# Patient Record
Sex: FEMALE | Race: OTHER | NOT HISPANIC OR LATINO | URBAN - METROPOLITAN AREA
[De-identification: names, ages, dates, MRNs, and addresses within clinical notes are randomized per-mention and may not be internally consistent; named-entity substitution may affect disease eponyms.]

---

## 2024-01-31 ENCOUNTER — INPATIENT (INPATIENT)
Facility: HOSPITAL | Age: 24
LOS: 2 days | Discharge: HOME | End: 2024-02-03
Attending: OBSTETRICS & GYNECOLOGY | Admitting: OBSTETRICS & GYNECOLOGY
Payer: MEDICAID

## 2024-01-31 VITALS
HEART RATE: 89 BPM | SYSTOLIC BLOOD PRESSURE: 128 MMHG | DIASTOLIC BLOOD PRESSURE: 79 MMHG | OXYGEN SATURATION: 98 % | RESPIRATION RATE: 18 BRPM | TEMPERATURE: 98 F

## 2024-01-31 DIAGNOSIS — Z34.80 ENCOUNTER FOR SUPERVISION OF OTHER NORMAL PREGNANCY, UNSPECIFIED TRIMESTER: ICD-10-CM

## 2024-01-31 DIAGNOSIS — O26.899 OTHER SPECIFIED PREGNANCY RELATED CONDITIONS, UNSPECIFIED TRIMESTER: ICD-10-CM

## 2024-01-31 DIAGNOSIS — Z3A.39 39 WEEKS GESTATION OF PREGNANCY: ICD-10-CM

## 2024-01-31 LAB
APTT BLD: 29.4 SEC — SIGNIFICANT CHANGE UP (ref 24.5–35.6)
BASOPHILS # BLD AUTO: 0.04 K/UL — SIGNIFICANT CHANGE UP (ref 0–0.2)
BASOPHILS NFR BLD AUTO: 0.3 % — SIGNIFICANT CHANGE UP (ref 0–2)
BLD GP AB SCN SERPL QL: SIGNIFICANT CHANGE UP
EOSINOPHIL # BLD AUTO: 0 K/UL — SIGNIFICANT CHANGE UP (ref 0–0.5)
EOSINOPHIL NFR BLD AUTO: 0 % — SIGNIFICANT CHANGE UP (ref 0–6)
FIBRINOGEN PPP-MCNC: 478 MG/DL — HIGH (ref 200–475)
HCT VFR BLD CALC: 34.9 % — SIGNIFICANT CHANGE UP (ref 34.5–45)
HGB BLD-MCNC: 11.5 G/DL — SIGNIFICANT CHANGE UP (ref 11.5–15.5)
IMM GRANULOCYTES NFR BLD AUTO: 1 % — HIGH (ref 0–0.9)
INR BLD: 0.9 RATIO — SIGNIFICANT CHANGE UP (ref 0.85–1.18)
LYMPHOCYTES # BLD AUTO: 18.1 % — SIGNIFICANT CHANGE UP (ref 13–44)
LYMPHOCYTES # BLD AUTO: 2.84 K/UL — SIGNIFICANT CHANGE UP (ref 1–3.3)
MCHC RBC-ENTMCNC: 27.5 PG — SIGNIFICANT CHANGE UP (ref 27–34)
MCHC RBC-ENTMCNC: 33 GM/DL — SIGNIFICANT CHANGE UP (ref 32–36)
MCV RBC AUTO: 83.5 FL — SIGNIFICANT CHANGE UP (ref 80–100)
MONOCYTES # BLD AUTO: 0.72 K/UL — SIGNIFICANT CHANGE UP (ref 0–0.9)
MONOCYTES NFR BLD AUTO: 4.6 % — SIGNIFICANT CHANGE UP (ref 2–14)
NEUTROPHILS # BLD AUTO: 11.92 K/UL — HIGH (ref 1.8–7.4)
NEUTROPHILS NFR BLD AUTO: 76 % — SIGNIFICANT CHANGE UP (ref 43–77)
NRBC # BLD: 0 /100 WBCS — SIGNIFICANT CHANGE UP (ref 0–0)
PLATELET # BLD AUTO: 196 K/UL — SIGNIFICANT CHANGE UP (ref 150–400)
PROTHROM AB SERPL-ACNC: 10.3 SEC — SIGNIFICANT CHANGE UP (ref 9.5–13)
RBC # BLD: 4.18 M/UL — SIGNIFICANT CHANGE UP (ref 3.8–5.2)
RBC # FLD: 13.2 % — SIGNIFICANT CHANGE UP (ref 10.3–14.5)
WBC # BLD: 15.68 K/UL — HIGH (ref 3.8–10.5)
WBC # FLD AUTO: 15.68 K/UL — HIGH (ref 3.8–10.5)

## 2024-01-31 RX ORDER — OXYTOCIN 10 UNIT/ML
333.33 VIAL (ML) INJECTION
Qty: 20 | Refills: 0 | Status: COMPLETED | OUTPATIENT
Start: 2024-01-31

## 2024-01-31 RX ORDER — OXYTOCIN 10 UNIT/ML
VIAL (ML) INJECTION
Qty: 30 | Refills: 0 | Status: DISCONTINUED | OUTPATIENT
Start: 2024-01-31 | End: 2024-02-01

## 2024-01-31 RX ORDER — CITRIC ACID/SODIUM CITRATE 300-500 MG
15 SOLUTION, ORAL ORAL EVERY 6 HOURS
Refills: 0 | Status: DISCONTINUED | OUTPATIENT
Start: 2024-01-31 | End: 2024-02-01

## 2024-01-31 RX ORDER — SODIUM CHLORIDE 9 MG/ML
1000 INJECTION, SOLUTION INTRAVENOUS
Refills: 0 | Status: DISCONTINUED | OUTPATIENT
Start: 2024-01-31 | End: 2024-02-03

## 2024-01-31 RX ORDER — CHLORHEXIDINE GLUCONATE 213 G/1000ML
1 SOLUTION TOPICAL DAILY
Refills: 0 | Status: DISCONTINUED | OUTPATIENT
Start: 2024-01-31 | End: 2024-02-01

## 2024-01-31 RX ORDER — OXYTOCIN 10 UNIT/ML
333.33 VIAL (ML) INJECTION
Qty: 20 | Refills: 0 | Status: DISCONTINUED | OUTPATIENT
Start: 2024-01-31 | End: 2024-02-01

## 2024-01-31 RX ORDER — SODIUM CHLORIDE 9 MG/ML
1000 INJECTION, SOLUTION INTRAVENOUS
Refills: 0 | Status: DISCONTINUED | OUTPATIENT
Start: 2024-01-31 | End: 2024-02-01

## 2024-01-31 RX ADMIN — SODIUM CHLORIDE 125 MILLILITER(S): 9 INJECTION, SOLUTION INTRAVENOUS at 16:18

## 2024-01-31 RX ADMIN — SODIUM CHLORIDE 125 MILLILITER(S): 9 INJECTION, SOLUTION INTRAVENOUS at 20:40

## 2024-01-31 RX ADMIN — Medication 2 MILLIUNIT(S)/MIN: at 16:42

## 2024-01-31 NOTE — OB PROVIDER TRIAGE NOTE - HISTORY OF PRESENT ILLNESS
22 yo  at 39.6 wks (LMP 23, EDIS 24) presenting from the office for early labor. Pt seen by Dr. Da Silva and was dilated to 3 cm and brought to the hospital by ambulance. Pt experiencing contraction pain every 6 minutes. Reports + fetal movement. Denies vaginal bleeding, loss of fluid.    PNC: Followed by Dr. Da Silva. GBS negative, RPR immune, O pos. Denies GDM, GHTN.   GynHx: Denies fibroids, cysts, STD, abnormal PAP  PMHx: denies   Meds: previously taking PO iron but d/c due to vomiting   PSHx: denies   Allergies: no known allergies   Social: denies tobacco/etoh/drug use   Psych: denies anxiety, depression, PTSD

## 2024-01-31 NOTE — OB PROVIDER IHI INDUCTION/AUGMENTATION NOTE - NS_OBIHIFHRDETAILS_OBGYN_ALL_OB_FT
Baseline: 150 bpm  Variability: Moderate variability  Accelerations: Absent  Decelerations: Absent    Cat I tracing, nonreactive

## 2024-01-31 NOTE — OB RN TRIAGE NOTE - FALL HARM RISK - UNIVERSAL INTERVENTIONS
Bed in lowest position, wheels locked, appropriate side rails in place/Call bell, personal items and telephone in reach/Instruct patient to call for assistance before getting out of bed or chair/Non-slip footwear when patient is out of bed/[place] to call system/Physically safe environment - no spills, clutter or unnecessary equipment/Purposeful Proactive Rounding/Room/bathroom lighting operational, light cord in reach

## 2024-01-31 NOTE — OB PROVIDER TRIAGE NOTE - NSHPPHYSICALEXAM_GEN_ALL_CORE
General: pt is resting comfortably in bed in between contractions, breathing deep through each contraction, NAD, A&Ox3  Abdominal: gravid uterus, soft nontender, no guarding or rebound tenderness   Extremities: no edema   Vaginal: no external or internal lesions, no gross vaginal bleeding    4/70/-3    FHT: baseline 150, moderate variability, + accels   toco q 6-7 minutes

## 2024-01-31 NOTE — OB PROVIDER LABOR PROGRESS NOTE - ASSESSMENT
dereck A/P 24yo GP presenting . Patient is stable.  - cont close maternal and fetal monitoring  - for epidural  - cont pitocin  - d/w Dr. Mir kaur attending

## 2024-01-31 NOTE — OB PROVIDER H&P - HISTORY OF PRESENT ILLNESS
24 yo  at 39.6 wks (LMP 23, EDIS 24) presenting from the office for early labor. Pt seen by Dr. Da Silva and was dilated to 3 cm and brought to the hospital by ambulance. Pt experiencing contraction pain every 6 minutes. Reports + fetal movement. Denies vaginal bleeding, loss of fluid.  Denies headache, visual disturbances, epigastric pain, pelvic/abdominal pain.     PNC: Followed by Dr. Da Silva. GBS negative, RPR immune, O pos. Denies GDM, GHTN.   GynHx: Denies fibroids, cysts, STD, abnormal PAP  PMHx: denies   Meds: previously taking PO iron but d/c due to vomiting   PSHx: denies   Allergies: no known allergies   Social: denies tobacco/etoh/drug use   Psych: denies anxiety, depression, PTSD

## 2024-01-31 NOTE — OB PROVIDER H&P - ASSESSMENT
22 yo  at 39.6 wks presenting for early labor     GBS negative, RPR immune     expect NVSD  discussed with Dr. Hester  22 yo  at 39.6 wks presenting in early labor. Patient is stable.  -admit to labor and delivery  - f/up rpr, coags, t&s  - consents signed  - patient does not desire epidural at this time  -discussed with Dr. Hester

## 2024-01-31 NOTE — OB PROVIDER H&P - NSHPPHYSICALEXAM_GEN_ALL_CORE
General: patient resting comfortably in bed, NAD, A&Ox3  Abdominal: gravid uterus, soft nontender, no guarding or rebound tenderness  Extremities: no edema   Vaginal: 4/70/-3

## 2024-01-31 NOTE — OB PROVIDER TRIAGE NOTE - NSOBPROVIDERNOTE_OBGYN_ALL_OB_FT
22 yo  at 39.6 wks presenting from the office for early labor, 3 cm dilation.     Expectant management   Start Pitocin to facilitate contractions   NST reviewed with Dr. Hester

## 2024-01-31 NOTE — OB RN DELIVERY SUMMARY - NS_SEPSISRSKCALC_OBGYN_ALL_OB_FT
GBS status in the 'Prenatal Lab tests/results section' on the OB RN Patient Profile must be documented.   EOS calculated successfully. EOS Risk Factor: 0.5/1000 live births ([facility] national incidence); GA=40w;Temp=98.6; ROM=7.417; GBS='Negative'; Antibiotics='No antibiotics or any antibiotics < 2 hrs prior to birth'

## 2024-01-31 NOTE — OB RN DELIVERY SUMMARY - NSSELHIDDEN_OBGYN_ALL_OB_FT
[NS_DeliveryAttending1_OBGYN_ALL_OB_FT:[email or web address]=] [NS_DeliveryAttending1_OBGYN_ALL_OB_FT:[email or web address]=],[NS_DeliveryAssist1_OBGYN_ALL_OB_FT:[id number]==]

## 2024-01-31 NOTE — OB PROVIDER LABOR PROGRESS NOTE - NS_OBIHIFHRDETAILS_OBGYN_ALL_OB_FT
minimal variability with [id number], no accels, no decels   reviewed with Dr. Rose
Baseline: 150 bpm  Variability: Moderate variability  Accelerations: Present  Decelerations: prolonged    Cat II tracing, reactive

## 2024-01-31 NOTE — OB PROVIDER H&P - NSLOWPPHRISK_OBGYN_A_OB
No previous uterine incision/Fang Pregnancy/Less than or equal to 4 previous vaginal births/No known bleeding disorder/No history of postpartum hemorrhage

## 2024-01-31 NOTE — OB PROVIDER LABOR PROGRESS NOTE - ASSESSMENT
22yo  siup @ 39.6wks, in early labor  NST reviewed with Dr. Hester, continue close fetal monitoring and resuscitation  Continue pitocin augmentation  ISE/IUPC in place with amnioinfusion

## 2024-02-01 LAB
BASOPHILS # BLD AUTO: 0.05 K/UL — SIGNIFICANT CHANGE UP (ref 0–0.2)
BASOPHILS NFR BLD AUTO: 0.3 % — SIGNIFICANT CHANGE UP (ref 0–2)
EOSINOPHIL # BLD AUTO: 0.01 K/UL — SIGNIFICANT CHANGE UP (ref 0–0.5)
EOSINOPHIL NFR BLD AUTO: 0.1 % — SIGNIFICANT CHANGE UP (ref 0–6)
HCT VFR BLD CALC: 30.7 % — LOW (ref 34.5–45)
HGB BLD-MCNC: 9.9 G/DL — LOW (ref 11.5–15.5)
IMM GRANULOCYTES NFR BLD AUTO: 1.2 % — HIGH (ref 0–0.9)
LYMPHOCYTES # BLD AUTO: 23.3 % — SIGNIFICANT CHANGE UP (ref 13–44)
LYMPHOCYTES # BLD AUTO: 4.09 K/UL — HIGH (ref 1–3.3)
MCHC RBC-ENTMCNC: 27.4 PG — SIGNIFICANT CHANGE UP (ref 27–34)
MCHC RBC-ENTMCNC: 32.2 GM/DL — SIGNIFICANT CHANGE UP (ref 32–36)
MCV RBC AUTO: 85 FL — SIGNIFICANT CHANGE UP (ref 80–100)
MONOCYTES # BLD AUTO: 1.31 K/UL — HIGH (ref 0–0.9)
MONOCYTES NFR BLD AUTO: 7.5 % — SIGNIFICANT CHANGE UP (ref 2–14)
NEUTROPHILS # BLD AUTO: 11.86 K/UL — HIGH (ref 1.8–7.4)
NEUTROPHILS NFR BLD AUTO: 67.6 % — SIGNIFICANT CHANGE UP (ref 43–77)
NRBC # BLD: 0 /100 WBCS — SIGNIFICANT CHANGE UP (ref 0–0)
PLATELET # BLD AUTO: 193 K/UL — SIGNIFICANT CHANGE UP (ref 150–400)
RBC # BLD: 3.61 M/UL — LOW (ref 3.8–5.2)
RBC # FLD: 13.6 % — SIGNIFICANT CHANGE UP (ref 10.3–14.5)
T PALLIDUM AB TITR SER: NEGATIVE — SIGNIFICANT CHANGE UP
WBC # BLD: 17.53 K/UL — HIGH (ref 3.8–10.5)
WBC # FLD AUTO: 17.53 K/UL — HIGH (ref 3.8–10.5)

## 2024-02-01 PROCEDURE — 88307 TISSUE EXAM BY PATHOLOGIST: CPT | Mod: 26

## 2024-02-01 RX ORDER — AER TRAVELER 0.5 G/1
1 SOLUTION RECTAL; TOPICAL EVERY 4 HOURS
Refills: 0 | Status: DISCONTINUED | OUTPATIENT
Start: 2024-02-01 | End: 2024-02-03

## 2024-02-01 RX ORDER — LANOLIN
1 OINTMENT (GRAM) TOPICAL EVERY 6 HOURS
Refills: 0 | Status: DISCONTINUED | OUTPATIENT
Start: 2024-02-01 | End: 2024-02-03

## 2024-02-01 RX ORDER — MAGNESIUM HYDROXIDE 400 MG/1
30 TABLET, CHEWABLE ORAL
Refills: 0 | Status: DISCONTINUED | OUTPATIENT
Start: 2024-02-01 | End: 2024-02-03

## 2024-02-01 RX ORDER — PRAMOXINE HYDROCHLORIDE 150 MG/15G
1 AEROSOL, FOAM RECTAL EVERY 4 HOURS
Refills: 0 | Status: DISCONTINUED | OUTPATIENT
Start: 2024-02-01 | End: 2024-02-03

## 2024-02-01 RX ORDER — DIBUCAINE 1 %
1 OINTMENT (GRAM) RECTAL EVERY 6 HOURS
Refills: 0 | Status: DISCONTINUED | OUTPATIENT
Start: 2024-02-01 | End: 2024-02-03

## 2024-02-01 RX ORDER — SODIUM CHLORIDE 9 MG/ML
3 INJECTION INTRAMUSCULAR; INTRAVENOUS; SUBCUTANEOUS EVERY 8 HOURS
Refills: 0 | Status: DISCONTINUED | OUTPATIENT
Start: 2024-02-01 | End: 2024-02-03

## 2024-02-01 RX ORDER — INFLUENZA VIRUS VACCINE 15; 15; 15; 15 UG/.5ML; UG/.5ML; UG/.5ML; UG/.5ML
0.5 SUSPENSION INTRAMUSCULAR ONCE
Refills: 0 | Status: DISCONTINUED | OUTPATIENT
Start: 2024-02-01 | End: 2024-02-03

## 2024-02-01 RX ORDER — BENZOCAINE 10 %
1 GEL (GRAM) MUCOUS MEMBRANE EVERY 6 HOURS
Refills: 0 | Status: DISCONTINUED | OUTPATIENT
Start: 2024-02-01 | End: 2024-02-03

## 2024-02-01 RX ORDER — IBUPROFEN 200 MG
600 TABLET ORAL EVERY 6 HOURS
Refills: 0 | Status: DISCONTINUED | OUTPATIENT
Start: 2024-02-01 | End: 2024-02-03

## 2024-02-01 RX ORDER — ACETAMINOPHEN 500 MG
975 TABLET ORAL EVERY 6 HOURS
Refills: 0 | Status: DISCONTINUED | OUTPATIENT
Start: 2024-02-01 | End: 2024-02-03

## 2024-02-01 RX ORDER — KETOROLAC TROMETHAMINE 30 MG/ML
30 SYRINGE (ML) INJECTION ONCE
Refills: 0 | Status: DISCONTINUED | OUTPATIENT
Start: 2024-02-01 | End: 2024-02-03

## 2024-02-01 RX ORDER — SIMETHICONE 80 MG/1
80 TABLET, CHEWABLE ORAL EVERY 4 HOURS
Refills: 0 | Status: DISCONTINUED | OUTPATIENT
Start: 2024-02-01 | End: 2024-02-03

## 2024-02-01 RX ORDER — TETANUS TOXOID, REDUCED DIPHTHERIA TOXOID AND ACELLULAR PERTUSSIS VACCINE, ADSORBED 5; 2.5; 8; 8; 2.5 [IU]/.5ML; [IU]/.5ML; UG/.5ML; UG/.5ML; UG/.5ML
0.5 SUSPENSION INTRAMUSCULAR ONCE
Refills: 0 | Status: DISCONTINUED | OUTPATIENT
Start: 2024-02-01 | End: 2024-02-03

## 2024-02-01 RX ORDER — OXYCODONE HYDROCHLORIDE 5 MG/1
5 TABLET ORAL
Refills: 0 | Status: DISCONTINUED | OUTPATIENT
Start: 2024-02-01 | End: 2024-02-03

## 2024-02-01 RX ORDER — IBUPROFEN 200 MG
600 TABLET ORAL EVERY 6 HOURS
Refills: 0 | Status: COMPLETED | OUTPATIENT
Start: 2024-02-01 | End: 2024-12-30

## 2024-02-01 RX ORDER — DIPHENHYDRAMINE HCL 50 MG
25 CAPSULE ORAL EVERY 6 HOURS
Refills: 0 | Status: DISCONTINUED | OUTPATIENT
Start: 2024-02-01 | End: 2024-02-03

## 2024-02-01 RX ORDER — HYDROCORTISONE 1 %
1 OINTMENT (GRAM) TOPICAL EVERY 6 HOURS
Refills: 0 | Status: DISCONTINUED | OUTPATIENT
Start: 2024-02-01 | End: 2024-02-03

## 2024-02-01 RX ADMIN — Medication 975 MILLIGRAM(S): at 12:39

## 2024-02-01 RX ADMIN — SODIUM CHLORIDE 3 MILLILITER(S): 9 INJECTION INTRAMUSCULAR; INTRAVENOUS; SUBCUTANEOUS at 05:58

## 2024-02-01 RX ADMIN — SODIUM CHLORIDE 3 MILLILITER(S): 9 INJECTION INTRAMUSCULAR; INTRAVENOUS; SUBCUTANEOUS at 14:00

## 2024-02-01 RX ADMIN — Medication 975 MILLIGRAM(S): at 11:39

## 2024-02-01 RX ADMIN — Medication 975 MILLIGRAM(S): at 03:53

## 2024-02-01 RX ADMIN — MAGNESIUM HYDROXIDE 30 MILLILITER(S): 400 TABLET, CHEWABLE ORAL at 21:44

## 2024-02-01 RX ADMIN — SODIUM CHLORIDE 3 MILLILITER(S): 9 INJECTION INTRAMUSCULAR; INTRAVENOUS; SUBCUTANEOUS at 21:50

## 2024-02-01 RX ADMIN — Medication 975 MILLIGRAM(S): at 04:53

## 2024-02-01 RX ADMIN — Medication 975 MILLIGRAM(S): at 22:43

## 2024-02-01 RX ADMIN — Medication 1 TABLET(S): at 11:39

## 2024-02-01 RX ADMIN — Medication 1000 MILLIUNIT(S)/MIN: at 01:05

## 2024-02-01 RX ADMIN — MAGNESIUM HYDROXIDE 30 MILLILITER(S): 400 TABLET, CHEWABLE ORAL at 11:40

## 2024-02-01 RX ADMIN — Medication 975 MILLIGRAM(S): at 21:43

## 2024-02-01 NOTE — OB RN PATIENT PROFILE - EDUCATION OF THE PLACEMENT OF INFANT DURING SKIN TO SKIN: THE INFANT IS TO BE PLACED BELLY DOWN DIRECTLY ON PARENT'S CHEST, POSITIONED WITH INFANT'S FACE TOWARD PARENT'S FACE, SO THE PARENT CAN OBSERVE THE INFANT’S COLOR AT ALL TIMES.
Left message for patient to call back. Message nurse when patient returns call.    Statement Selected

## 2024-02-01 NOTE — OB PROVIDER DELIVERY SUMMARY - NSPROVIDERDELIVERYNOTE_OBGYN_ALL_OB_FT
Delivered per vagina liveborn female infant apgar 9/9.  Dr Torres present for meconium.  Cord for gs and cord blood obtained.  Placenta delivered intact.  Uterus firm.  Second degree laceration repaired with chromic suture.  Mother and baby stable.  Skin to skin initiated.  Placenta to pathology

## 2024-02-01 NOTE — OB RN PATIENT PROFILE - NS_NPO_OBGYN_ALL_OB_DT
[facility]REG HOSPITALIST  Progress Note     Gagan Owne Patient Status:  Observation    1930 MRN [id number]   [facility] 2NE-A Attending Carlee Linares MD   Hosp Day # 0 PCP Jo Ann Kirkpatrick DO     Chief Complaint: Dyspnea     S: Patient f Daily   • apixaban  2.5 mg Oral BID   • ARIPiprazole  1 mg Oral Daily   • aspirin  81 mg Oral Daily   • atorvastatin  10 mg Oral Nightly   • Donepezil HCl  10 mg Oral Nightly   • DULoxetine HCl  60 mg Oral Daily   • hydrALAzine HCl  25 mg Oral BID   • [place] 30-Jan-2024 23:00

## 2024-02-01 NOTE — OB RN PATIENT PROFILE - TEACHING/LEARNING DEVELOPMENTAL CONSIDERATIONS
[facility] 
 
 
 [address] [facility] [address] [id number] 
[phone] Patient: Blake Melgar MRN: [id number] :1957 Visit Information Date & Time Provider Department Dept. Phone Encounter #  
 3/23/2018 11:30 AM Kavon Mauro, [facility] [address] [id number] Follow-up Instructions Return in about 5 days (around 3/28/2018). Your Appointments 3/27/2018 11:00 AM  
Follow Up with Kavon Mauro MD  
[address] [facility] Appt Note: One week follow up  
 [address] [facility] [address]  
  
   
 [address] [facility] [address] [facility] [address] Upcoming Health Maintenance Date Due Hepatitis C Screening 1957 Pneumococcal 19-64 Medium Risk (1 of 1 - PPSV23) 10/29/1976 DTaP/Tdap/Td series (1 - Tdap) 10/29/1978 FOBT Q 1 YEAR AGE 50-75 10/29/2007 Influenza Age 5 to Adult 2017 ZOSTER VACCINE AGE 60> 2017 BREAST CANCER SCRN MAMMOGRAM 2019 PAP AKA CERVICAL CYTOLOGY 2020 Allergies as of 3/23/2018  Review Complete On: 3/23/2018 By: Kavon Mauro MD  
  
 Severity Noted Reaction Type Reactions Lisinopril  2016    Cough Current Immunizations  Never Reviewed No immunizations on file. Not reviewed this visit You Were Diagnosed With   
  
 Codes Comments Breast abscess    -  Primary ICD-10-CM: N61.1 ICD-9-CM: 611.0 Vitals BP Pulse Temp Resp Height(growth percentile) Weight(growth percentile) 125/81 (BP 1 Location: Left arm, BP Patient Position: Sitting) (!) 118 97.5 °F (36.4 °C) (Oral) 18 5' (1.524 m) 158 lb 3.2 oz (71.8 kg) SpO2 BMI OB Status Smoking Status 96% 30.9 kg/m2 Postmenopausal Former Smoker BMI and BSA Data Body Mass Index Body Surface Area  30.9 kg/m 2 1.74 m 2  
  
  
 Preferred Pharmacy Pharmacy Name Phone [address], [address] [phone] Your Updated Medication List  
  
   
This list is accurate as of 3/23/18 12:27 PM.  Always use your most recent med list.  
  
  
  
  
 amoxicillin-clavulanate 500-125 mg per tablet Commonly known as:  AUGMENTIN Take  by mouth. cyclobenzaprine 5 mg tablet Commonly known as:  FLEXERIL  
TAKE 1 TABLET BY MOUTH EVERY 6 TO 8 HOURS AS NEEDED FOR MUSCLE SPASM  
  
 glipiZIDE 10 mg tablet Commonly known as:  GLUCOTROL  
  
 HYZAAR 100-12.5 mg per tablet Generic drug:  losartan-hydroCHLOROthiazide Take 1 Tab by mouth daily. metFORMIN 500 mg tablet Commonly known as:  GLUCOPHAGE Take  by mouth two (2) times daily (with meals). Patient states daily  
  
 nystatin-triamcinolone 100,000-0.1 unit/gram-% ointment Commonly known as:  Erica Home Apply  to affected area two (2) times a day. simvastatin 20 mg tablet Commonly known as:  ZOCOR Follow-up Instructions Return in about 5 days (around 3/28/2018). Patient Instructions If you have any questions or concerns about today's appointment, the verbal and/or written instructions you were given for follow up care, please call our office at [phone]. [facility] [place] [phone] office [phone] Introducing \[facility] [place] [facility]\"" & HEALTH SERVICES! [facility]ering introduces [facility] patient portal. Now you can access parts of your medical record, email your doctor's office, and request medication refills online. 1. In your internet browser, go to [email or web address] 2. Click on the First Time User? Click Here link in the Sign In box. You will see the New Member Sign Up page. 3. Enter your [facility] Access Code exactly as it appears below.  You will not need to use this code after youve completed the sign-up process. If you do not sign up before the expiration date, you must request a new code. · [employer] Access Code: [email or web address] Expires: 5/16/2018  2:55 PM 
 
4. Enter the last four digits of your Social Security Number (xxxx) and Date of Birth (mm/dd/yyyy) as indicated and click Submit. You will be taken to the next sign-up page. 5. Create a [employer] ID. This will be your [facility] login ID and cannot be changed, so think of one that is secure and easy to remember. 6. Create a [facility] password. You can change your password at any time. 7. Enter your Password Reset Question and Answer. This can be used at a later time if you forget your password. 8. Enter your e-mail address. You will receive e-mail notification when new information is available in [address]. 9. Click Sign Up. You can now view and download portions of your medical record. 10. Click the Download Summary menu link to download a portable copy of your medical information. If you have questions, please visit the Frequently Asked Questions section of the [employer] website. Remember, [employer] is NOT to be used for urgent needs. For medical emergencies, dial 911. Now available from your iPhone and Android! Please provide this summary of care documentation to your next provider. Your primary care clinician is listed as TIFFANI MACIEL. If you have any questions after today's visit, please call [phone]. none

## 2024-02-01 NOTE — OB RN PATIENT PROFILE - FALL HARM RISK - RISK INTERVENTIONS
Assistance OOB with selected safe patient handling equipment/Assistance with ambulation/Bed in lowest position, wheels locked, appropriate side rails in place/[place] to call system

## 2024-02-02 RX ADMIN — Medication 975 MILLIGRAM(S): at 20:43

## 2024-02-02 RX ADMIN — SODIUM CHLORIDE 3 MILLILITER(S): 9 INJECTION INTRAMUSCULAR; INTRAVENOUS; SUBCUTANEOUS at 05:48

## 2024-02-02 RX ADMIN — MAGNESIUM HYDROXIDE 30 MILLILITER(S): 400 TABLET, CHEWABLE ORAL at 20:44

## 2024-02-02 RX ADMIN — Medication 1 TABLET(S): at 11:28

## 2024-02-02 RX ADMIN — SODIUM CHLORIDE 3 MILLILITER(S): 9 INJECTION INTRAMUSCULAR; INTRAVENOUS; SUBCUTANEOUS at 13:54

## 2024-02-02 RX ADMIN — Medication 975 MILLIGRAM(S): at 21:43

## 2024-02-02 RX ADMIN — SODIUM CHLORIDE 3 MILLILITER(S): 9 INJECTION INTRAMUSCULAR; INTRAVENOUS; SUBCUTANEOUS at 23:24

## 2024-02-02 RX ADMIN — Medication 975 MILLIGRAM(S): at 05:48

## 2024-02-02 RX ADMIN — MAGNESIUM HYDROXIDE 30 MILLILITER(S): 400 TABLET, CHEWABLE ORAL at 11:28

## 2024-02-02 RX ADMIN — Medication 975 MILLIGRAM(S): at 05:23

## 2024-02-02 NOTE — DISCHARGE NOTE OB - PLAN OF CARE
Post partum care: Nothing in the vagina for 5-6 weeks. No tampons, no tub baths, no douching or sex. Continue taking your prenatal vitamins as long as you are breastfeeding or at least for 6 weeks. Follow up in 5-6 weeks for your post partum check up. please seek medical attention if there is any heavy bleeding that does not stop, if there an abnormal vaginal smell.

## 2024-02-02 NOTE — PROGRESS NOTE ADULT - SUBJECTIVE AND OBJECTIVE BOX
Patient seen at bedside resting comfortably offers no current complaints. Ambulating and voiding without difficulty.  Passing flatus but no BM,  tolerating regular diet. Pt bottle feeding . Pt denies headache, chest pain, weakness, dizziness, N/V/D, palpitations,  worsening vaginal bleeding, or any other concerns.      Vital Signs Last 24 Hrs  T(C): 36.7 (02 Feb 2024 06:28), Max: 36.8 (01 Feb 2024 12:53)  T(F): 98.1 (02 Feb 2024 06:28), Max: 98.2 (01 Feb 2024 12:53)  HR: 79 (02 Feb 2024 06:28) (79 - 89)  BP: 118/69 (02 Feb 2024 06:28) (100/62 - 127/82)  BP(mean): --  RR: 18 (02 Feb 2024 06:28) (18 - 18)  SpO2: 97% (02 Feb 2024 06:28) (97% - 99%)    Parameters below as of 02 Feb 2024 06:28  Patient On (Oxygen Delivery Method): room air        Physical Exam:     Gen: A&Ox 3, NAD  Breast: Soft, nontender, nonengorged  Abdomen: +BS, Soft, nontender, ND; Fundus firm below umbilicus  Gyn: mod lochia, intact/repaired  Ext: Nontender, no worsening edema                          9.9    17.53 )-----------( 193      ( 01 Feb 2024 17:35 )             30.7                  Patient seen at bedside resting comfortably offers no current complaints. Ambulating and voiding without difficulty.  Passing flatus but no BM,  tolerating regular diet. Pt bottle feeding . Pt denies headache, chest pain, weakness, dizziness, N/V/D, palpitations,  worsening vaginal bleeding, or any other concerns.      Vital Signs Last 24 Hrs  T(C): 36.7 (02 Feb 2024 06:28), Max: 36.8 (01 Feb 2024 12:53)  T(F): 98.1 (02 Feb 2024 06:28), Max: 98.2 (01 Feb 2024 12:53)  HR: 79 (02 Feb 2024 06:28) (79 - 89)  BP: 118/69 (02 Feb 2024 06:28) (100/62 - 127/82)  BP(mean): --  RR: 18 (02 Feb 2024 06:28) (18 - 18)  SpO2: 97% (02 Feb 2024 06:28) (97% - 99%)    Parameters below as of 02 Feb 2024 06:28  Patient On (Oxygen Delivery Method): room air        Physical Exam:     Gen: A&Ox 3, NAD  Breast: Soft, nontender, nonengorged  Abdomen: +BS, Soft, nontender, ND; Fundus firm below umbilicus  Gyn: mod lochia,repaired  Ext: Nontender, no worsening edema                          9.9    17.53 )-----------( 193      ( 01 Feb 2024 17:35 )             30.7

## 2024-02-02 NOTE — DISCHARGE NOTE OB - CARE PROVIDER_API CALL
Gretta Da Silva  Obstetrics and Gynecology  [address] [place] [address]  Phone: [phone]  Fax: [phone]  Follow Up Time: Routine

## 2024-02-02 NOTE — DISCHARGE NOTE OB - DISCHARGE DATE
Problem: Patient Care Overview  Goal: Plan of Care Review  Outcome: Ongoing (interventions implemented as appropriate)  Instructed mother to breastfeed 8 -12 times in 24 hours, monitor infant cues, verbalizes understanding.       03-Feb-2024

## 2024-02-02 NOTE — DISCHARGE NOTE OB - HOSPITAL COURSE
24 y/o  s/p  at 39w6d without any acute complications. Routine post partum care received Patient stable for discharge

## 2024-02-02 NOTE — DISCHARGE NOTE OB - CARE PLAN
Principal Discharge DX:	 (normal spontaneous vaginal delivery)  Assessment and plan of treatment:	Post partum care: Nothing in the vagina for 5-6 weeks. No tampons, no tub baths, no douching or sex. Continue taking your prenatal vitamins as long as you are breastfeeding or at least for 6 weeks. Follow up in 5-6 weeks for your post partum check up. please seek medical attention if there is any heavy bleeding that does not stop, if there an abnormal vaginal smell.   1

## 2024-02-02 NOTE — LACTATION INITIAL EVALUATION - LACTATION INTERVENTIONS
Breastfeeding on cue 8-12X/24 hours with diaper count to assess for adequate intake, safe skin to skin and rooming-in encouraged./initiate/review hand expression/reviewed components of an effective feeding and at least 8 effective feedings per day required/reviewed importance of monitoring infant diapers, the breastfeeding log, and minimum output each day/reviewed risks of unnecessary formula supplementation/reviewed risks of artificial nipples/reviewed benefits and recommendations for rooming in/reviewed feeding on demand/by cue at least 8 times a day/reviewed indications of inadequate milk transfer that would require supplementation

## 2024-02-02 NOTE — DISCHARGE NOTE OB - PATIENT PORTAL LINK FT
You can access the FollowMyHealth Patient Portal offered by [facility] by registering at the following website: [email or web address]. By joining [facility]’s FollowMyHealth portal, you will also be able to view your health information using other applications (apps) compatible with our system.

## 2024-02-02 NOTE — PROGRESS NOTE ADULT - PROBLEM SELECTOR PLAN 1
-Continue pain management  -DVT ppx: Encourage OOB and ambulation  -Continue postpartum care  - continue PO diet  - encourage breastfeeding   -Plan for discharge tomorrow  -d/w Dr. Mckee  - Seen and evaluated with Lauren MCCOY

## 2024-02-02 NOTE — DISCHARGE NOTE OB - CALL YOUR HEALTHCARE PROVIDER IF YOU ARE EXPERIENCING SYMPTOMS OF DEPRESSION THAT LAST MORE THAN THREE DAYS

## 2024-02-03 VITALS
RESPIRATION RATE: 17 BRPM | HEART RATE: 74 BPM | SYSTOLIC BLOOD PRESSURE: 120 MMHG | TEMPERATURE: 98 F | OXYGEN SATURATION: 98 % | DIASTOLIC BLOOD PRESSURE: 81 MMHG

## 2024-02-03 PROCEDURE — 59050 FETAL MONITOR W/REPORT: CPT

## 2024-02-03 PROCEDURE — 36415 COLL VENOUS BLD VENIPUNCTURE: CPT

## 2024-02-03 PROCEDURE — 88307 TISSUE EXAM BY PATHOLOGIST: CPT

## 2024-02-03 PROCEDURE — 85025 COMPLETE CBC W/AUTO DIFF WBC: CPT

## 2024-02-03 PROCEDURE — 85384 FIBRINOGEN ACTIVITY: CPT

## 2024-02-03 PROCEDURE — 86900 BLOOD TYPING SEROLOGIC ABO: CPT

## 2024-02-03 PROCEDURE — 85730 THROMBOPLASTIN TIME PARTIAL: CPT

## 2024-02-03 PROCEDURE — 85610 PROTHROMBIN TIME: CPT

## 2024-02-03 PROCEDURE — 86780 TREPONEMA PALLIDUM: CPT

## 2024-02-03 PROCEDURE — 86850 RBC ANTIBODY SCREEN: CPT

## 2024-02-03 PROCEDURE — 86901 BLOOD TYPING SEROLOGIC RH(D): CPT

## 2024-02-03 RX ORDER — IBUPROFEN 200 MG
1 TABLET ORAL
Qty: 20 | Refills: 0
Start: 2024-02-03 | End: 2024-02-07

## 2024-02-03 RX ORDER — ACETAMINOPHEN 500 MG
3 TABLET ORAL
Qty: 0 | Refills: 0 | DISCHARGE
Start: 2024-02-03

## 2024-02-03 RX ADMIN — Medication 975 MILLIGRAM(S): at 07:20

## 2024-02-03 RX ADMIN — Medication 1 TABLET(S): at 11:10

## 2024-02-03 RX ADMIN — SODIUM CHLORIDE 3 MILLILITER(S): 9 INJECTION INTRAMUSCULAR; INTRAVENOUS; SUBCUTANEOUS at 06:55

## 2024-02-03 RX ADMIN — MAGNESIUM HYDROXIDE 30 MILLILITER(S): 400 TABLET, CHEWABLE ORAL at 11:10

## 2024-02-03 RX ADMIN — Medication 975 MILLIGRAM(S): at 06:20

## 2024-02-03 RX ADMIN — Medication 600 MILLIGRAM(S): at 11:09

## 2024-02-03 RX ADMIN — Medication 600 MILLIGRAM(S): at 12:00

## 2024-02-03 NOTE — PROGRESS NOTE ADULT - SUBJECTIVE AND OBJECTIVE BOX
Patient seen at bedside resting comfortably offers no current complaints.  Ambulating and voiding without difficulty.  Passing flatus and tolerating regular diet.  both breast/bottle feeding.  Denies HA, CP, SOB, N/V/D, dizziness, palpitations, worsening abdominal pain, worsening vaginal bleeding, or any other concerns.      Vital Signs Last 24 Hrs  T(C): 36.8 (03 Feb 2024 06:50), Max: 36.8 (02 Feb 2024 18:30)  T(F): 98.2 (03 Feb 2024 06:50), Max: 98.2 (02 Feb 2024 18:30)  HR: 74 (03 Feb 2024 06:50) (74 - 74)  BP: 120/81 (03 Feb 2024 06:50) (120/81 - 124/85)  BP(mean): 94 (03 Feb 2024 06:50) (94 - 94)  RR: 17 (03 Feb 2024 06:50) (17 - 19)  SpO2: 98% (03 Feb 2024 06:50) (97% - 98%)    Parameters below as of 03 Feb 2024 06:50  Patient On (Oxygen Delivery Method): room air        Physical Exam:     Gen: A&Ox 3, NAD  Chest: CTA B/L  Cardiac: S1,S2; RRR  Breast: Soft, nontender, nonengorged  Abdomen: +BS; Soft, nontender, ND; Fundus firm below umbilicus  Gyn: Min lochia  Ext: Nontender, DTRS 2+, no worsening edema                          9.9    17.53 )-----------( 193      ( 01 Feb 2024 17:35 )             30.7

## 2024-02-03 NOTE — PROGRESS NOTE ADULT - ASSESSMENT
A/P:  PPD#1 s/p  @ 39w6d without acute complications. Patient is reaching recovery milestones  and currently stable.   -Continue pain management  -DVT ppx: Encourage OOB and ambulation  -Continue postpartum care  - continue PO diet  - encourage breastfeeding   -Plan for discharge tomorrow  -d/w Dr. Mckee  - Seen and evaluated with Lauren MCCOY 
A/P:  PPD#2 s/p       - Discharge home with instructions  -Follow up in office in 5-6 weeks for postpartum visit  -Breastfeeding encouraged   -d/w dr motley present during rounds

## 2024-02-20 LAB — SURGICAL PATHOLOGY STUDY: SIGNIFICANT CHANGE UP
